# Patient Record
(demographics unavailable — no encounter records)

---

## 2025-05-21 NOTE — ASSESSMENT
[FreeTextEntry1] : The patient was advised of the diagnosis. The natural history of the pathology was explained in full to the patient in layman's terms. We discussed that the patient would be better off with early protected range of motion.  Splinting leads to a high incidence of stiffness.  I recommend shannon strapping(strap placed) and early range of motion exercises x 3weeks.  If motion is near normal the patient can continue with a home exercise program.  If the patient is having a lot of difficulty with self directed exercises, therapy may be recommended.  All questions were answered. The risks and benefits of surgical and non-surgical treatment alternatives were explained in full to the patient.  The affected finger is noted to be clean and dry.  A shannon loop was applied to the affected finger over the middle phalanx. It was then strapped to the adjacent finger in overlapping fashion. Fit and pressure were assessed as the strapping was applied and stopped when the desired amount of support was achieved  pt will maintain strap x 4 weeks.  RTO in 4 weeks if pain has not resolved.

## 2025-05-21 NOTE — HISTORY OF PRESENT ILLNESS
[Dull/Aching] : dull/aching [Localized] : localized [Tightness] : tightness [de-identified] : 2-3 days ago jammed left small finger, bruising has gotten better, still swollen RHD NKDA NoPMHx [FreeTextEntry1] : LFT pinky [FreeTextEntry5] : LFT pinky pain that developed a few days ago.

## 2025-05-21 NOTE — IMAGING
[de-identified] : Left hand skin intact no deformity swelling about the SF PIP TTP about the PIPj FAROM except mild stiffness of the  PIP NVID  Xray 3 views of the finger- no fractures

## 2025-05-21 NOTE — IMAGING
[de-identified] : Left hand skin intact no deformity swelling about the SF PIP TTP about the PIPj FAROM except mild stiffness of the  PIP NVID  Xray 3 views of the finger- no fractures

## 2025-05-21 NOTE — HISTORY OF PRESENT ILLNESS
[Dull/Aching] : dull/aching [Localized] : localized [Tightness] : tightness [de-identified] : 2-3 days ago jammed left small finger, bruising has gotten better, still swollen RHD NKDA NoPMHx [FreeTextEntry1] : LFT pinky [FreeTextEntry5] : LFT pinky pain that developed a few days ago.